# Patient Record
Sex: MALE | Race: WHITE
[De-identification: names, ages, dates, MRNs, and addresses within clinical notes are randomized per-mention and may not be internally consistent; named-entity substitution may affect disease eponyms.]

---

## 2019-07-04 ENCOUNTER — HOSPITAL ENCOUNTER (EMERGENCY)
Dept: HOSPITAL 11 - JP.ED | Age: 39
Discharge: HOME | End: 2019-07-04
Payer: COMMERCIAL

## 2019-07-04 DIAGNOSIS — Z87.891: ICD-10-CM

## 2019-07-04 DIAGNOSIS — Z79.899: ICD-10-CM

## 2019-07-04 DIAGNOSIS — F32.9: ICD-10-CM

## 2019-07-04 DIAGNOSIS — R68.84: Primary | ICD-10-CM

## 2019-07-04 DIAGNOSIS — F41.9: ICD-10-CM

## 2019-07-04 NOTE — EDM.PDOC
ED HPI GENERAL MEDICAL PROBLEM





- General


Chief Complaint: ENT Problem


Stated Complaint: JAW PAIN


Time Seen by Provider: 07/04/19 13:10


Source of Information: Reports: Patient, RN Notes Reviewed


History Limitations: Reports: No Limitations





- History of Present Illness


INITIAL COMMENTS - FREE TEXT/NARRATIVE: 





38-year-old gentleman presents emergency department today complaint of jaw pain

, he does have a history of sleep apnea uses a dental apparatus he has not used 

that for the last couple days, he may have been grinding his teeth pain is 

located right at the TMJ on the left side


  ** Left Upper Jaw


Pain Score (Numeric/FACES): 3





- Related Data


 Allergies











Allergy/AdvReac Type Severity Reaction Status Date / Time


 


No Known Allergies Allergy   Verified 07/04/19 12:55











Home Meds: 


 Home Meds





Sertraline HCl [Zoloft] 50 mg PO DAILY 07/04/19 [History]











Past Medical History


HEENT History: Reports: Impaired Vision


Musculoskeletal History: Reports: Back Pain, Chronic


Psychiatric History: Reports: Anxiety, Depression





- Past Surgical History


Head Surgeries/Procedures: Reports: None


HEENT Surgical History: Reports: Other (See Below)


GI Surgical History: Reports: Appendectomy


Musculoskeletal Surgical History: Reports: None


Dermatological Surgical History: Reports: None





Social & Family History





- Tobacco Use


Smoking Status *Q: Former Smoker


Used Tobacco, but Quit: Yes


Month/Year Tobacco Last Used: 2012





- Caffeine Use


Caffeine Use: Reports: Coffee





- Alcohol Use


Days Per Week of Alcohol Use: 5


Number of Drinks Per Day: 2


Total Drinks Per Week: 10





- Recreational Drug Use


Recreational Drug Use: No





ED ROS ENT





- Review of Systems


Review Of Systems: See Below


HEENT: Reports: Other (Jaw pain).  Denies: Dental Pain, Throat Pain, Throat 

Swelling


Respiratory: Reports: No Symptoms





ED EXAM, ENT





- Physical Exam


Exam: See Below


Text/Narrative:: 





Mouth mucosa is moist and pink there is no erythema or exudate noted in soft 

palate tongue is midline uvula is midline dentition is intact ears clear to 

landmarks and light reflex bilaterally he is tender point tenderness at the TMJ 

left side and unappreciated any significant edema pain is elicited when he 

bites down on a tongue depressor neck is supple no thyromegaly no tracheal 

deviation lungs are clear to auscultation bilaterally and cardiovascular 

demonstrates a regular rate and rhythm S1 and S2


Exam Limited By: No Limitations


General Appearance: Alert, WD/WN, No Apparent Distress





Course





- Vital Signs


Last Recorded V/S: 





 Last Vital Signs











Temp  97.3 F   07/04/19 12:56


 


Pulse  85   07/04/19 12:56


 


Resp  14   07/04/19 12:56


 


BP  138/84   07/04/19 12:56


 


Pulse Ox  97   07/04/19 12:56














- Orders/Labs/Meds


Meds: 





Medications














Discontinued Medications














Generic Name Dose Route Start Last Admin





  Trade Name Toro  PRN Reason Stop Dose Admin


 


Ketorolac Tromethamine  60 mg  07/04/19 13:14  





  Toradol  IM  07/04/19 13:15  





  ONETIME ONE   





     





     





     





     














Departure





- Departure


Time of Disposition: 13:18


Disposition: Home, Self-Care 01


Condition: Fair


Clinical Impression: 


 Jaw pain








- Discharge Information


Referrals: 


PCP,None [Primary Care Provider] - 


Additional Instructions: 


Continue to use ibuprofen for pain control can take 600 mg 4 times a day, also 

Flexeril one tablet by mouth 3 times a day as needed for muscle spasm, please 

follow-up with your primary care upon returning home





- Assessment/Plan


Plan: 





Assessment





Acuity = acute





Site and laterality = jaw pain left side  





Etiology  = TMJ point tenderness  





Manifestations = none  





Location of injury =  Home





Lab values = none  





Plan


Provided Toradol 60 mg IM prescription written for Flexeril 10 mg by mouth 3 

times a day when necessary total #15 he will use ibuprofen as well to control 

pain follow-up primary care upon returning home  

















 This note was dictated using dragon voice recognition software please call 

with any questions on syntax or grammar.